# Patient Record
Sex: MALE | Race: BLACK OR AFRICAN AMERICAN | NOT HISPANIC OR LATINO | URBAN - METROPOLITAN AREA
[De-identification: names, ages, dates, MRNs, and addresses within clinical notes are randomized per-mention and may not be internally consistent; named-entity substitution may affect disease eponyms.]

---

## 2017-08-18 ENCOUNTER — APPOINTMENT (RX ONLY)
Dept: URBAN - METROPOLITAN AREA CLINIC 37 | Facility: CLINIC | Age: 62
Setting detail: DERMATOLOGY
End: 2017-08-18

## 2017-08-18 ENCOUNTER — APPOINTMENT (RX ONLY)
Dept: URBAN - METROPOLITAN AREA CLINIC 38 | Facility: CLINIC | Age: 62
Setting detail: DERMATOLOGY
End: 2017-08-18

## 2017-08-18 DIAGNOSIS — L27.1 LOCALIZED SKIN ERUPTION DUE TO DRUGS AND MEDICAMENTS TAKEN INTERNALLY: ICD-10-CM

## 2017-08-18 DIAGNOSIS — L85.3 XEROSIS CUTIS: ICD-10-CM

## 2017-08-18 PROBLEM — L70.0 ACNE VULGARIS: Status: ACTIVE | Noted: 2017-08-18

## 2017-08-18 PROBLEM — J30.1 ALLERGIC RHINITIS DUE TO POLLEN: Status: ACTIVE | Noted: 2017-08-18

## 2017-08-18 PROBLEM — L30.9 DERMATITIS, UNSPECIFIED: Status: ACTIVE | Noted: 2017-08-18

## 2017-08-18 PROBLEM — Z85.118 PERSONAL HISTORY OF OTHER MALIGNANT NEOPLASM OF BRONCHUS AND LUNG: Status: ACTIVE | Noted: 2017-08-18

## 2017-08-18 PROCEDURE — ? TREATMENT REGIMEN

## 2017-08-18 PROCEDURE — 99202 OFFICE O/P NEW SF 15 MIN: CPT

## 2017-08-18 PROCEDURE — ? ADDITIONAL NOTES

## 2017-08-18 PROCEDURE — ? COUNSELING

## 2017-08-18 PROCEDURE — ? PRESCRIPTION

## 2017-08-18 RX ORDER — UREA 40 G/100G
CREAM TOPICAL TWICE DAILY
Qty: 1 | Refills: 3 | Status: ERX | COMMUNITY
Start: 2017-08-18

## 2017-08-18 RX ORDER — CLOBETASOL PROPIONATE 0.05 G/ML
SPRAY TOPICAL TWICE DAILY
Qty: 1 | Refills: 3 | Status: ERX | COMMUNITY
Start: 2017-08-18

## 2017-08-18 RX ADMIN — UREA: 40 CREAM TOPICAL at 14:52

## 2017-08-18 RX ADMIN — CLOBETASOL PROPIONATE: 0.05 SPRAY TOPICAL at 14:52

## 2017-08-18 ASSESSMENT — LOCATION ZONE DERM
LOCATION ZONE: PENIS
LOCATION ZONE: HAND
LOCATION ZONE: PENIS
LOCATION ZONE: HAND

## 2017-08-18 ASSESSMENT — LOCATION SIMPLE DESCRIPTION DERM
LOCATION SIMPLE: RIGHT HAND
LOCATION SIMPLE: LEFT HAND
LOCATION SIMPLE: PENIS
LOCATION SIMPLE: RIGHT HAND
LOCATION SIMPLE: PENIS
LOCATION SIMPLE: LEFT HAND

## 2017-08-18 ASSESSMENT — LOCATION DETAILED DESCRIPTION DERM
LOCATION DETAILED: RIGHT RADIAL PALM
LOCATION DETAILED: LEFT DORSAL SHAFT OF PENIS
LOCATION DETAILED: LEFT DORSAL SHAFT OF PENIS
LOCATION DETAILED: RIGHT RADIAL PALM
LOCATION DETAILED: LEFT RADIAL PALM
LOCATION DETAILED: LEFT RADIAL PALM

## 2017-08-18 NOTE — PROCEDURE: TREATMENT REGIMEN
Discontinue Regimen: Eucerin
Detail Level: Zone
Plan: Instructed patient to sign medical release form so our office can get his records from [facility] regarding which Chemotherapeutic agent he is currently on that caused the rash. Advised patient to use the Urea Cream on the affected area along with Clobex a spray avoiding the genitals. Also counseled patient on applying Vaseline to hands at night and sleep with gloves on. Patient verbalized understating of treatment plan and agreed. Patient will follow up in 2 weeks

## 2017-08-18 NOTE — HPI: RASH
How Severe Is Your Rash?: mild
Is This A New Presentation, Or A Follow-Up?: Rash
Additional History: Patient was diagnosed Liver and Lung Cancer a year ago. Patient moved from here recently from [place] and started seeing Dr. Mckee for his cancer. Since moving here patient started a new Chemotherapeutic drug. Patient stated one of the side effects of the drug is a rash. Patient states he is having extremely dry cracked skin on the hands and the genitals. The areas are also hyperpigmented. Patient has been using Eucerin on the rash with no signs of improvement.

## 2017-08-18 NOTE — PROCEDURE: MIPS QUALITY
Quality 431: Preventive Care And Screening: Unhealthy Alcohol Use - Screening: Patient screened for unhealthy alcohol use using a single question and scores less than 2 times per year
Detail Level: Detailed
Quality 226: Preventive Care And Screening: Tobacco Use: Screening And Cessation Intervention: Patient screened for tobacco and is a smoker AND received Cessation Counseling
Quality 130: Documentation Of Current Medications In The Medical Record: Current Medications Documented

## 2017-08-18 NOTE — PROCEDURE: TREATMENT REGIMEN
Plan: Instructed patient to sign medical release form so our office can get his records from [facility] regarding which Chemotherapeutic agent he is currently on that caused the rash. Advised patient to use the Urea Cream on the affected area along with Clobex a spray avoiding the genitals. Also counseled patient on applying Vaseline to hands at night and sleep with gloves on. Patient verbalized understating of treatment plan and agreed. Patient will follow up in 2 weeks
Detail Level: Zone
Discontinue Regimen: Eucerin

## 2017-08-18 NOTE — PROCEDURE: ADDITIONAL NOTES
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  He does not know which chemotherapy he is using.  Pt reports that his hands are itchy and dry.  They become painful when they crack.  Pt reports the rash happened after 2 weeks.\\n\\nDDx of psoriasiform dermatiits includes drug-induced psoriasis, hand/foot syndrome (from chemotherapy), versus eczematous dermatitis.\\n\\nWill treat hands with clobex spray BID to affected area (avoid on face/genitals/body folds).  SE fully discussed.  Will also treat with urea cream BID to affected area. Dry skin care discussed. Pt should apply vaseline BID to affected area.\\n\\nPt has some xerotic scale of the penile shaft.  Will apply vaseline to that area.\\n\\nPt agreed with treatment plan and will f/u in 2 weeks to evaluate response to treatment.

## 2017-08-18 NOTE — HPI: RASH
How Severe Is Your Rash?: mild
Is This A New Presentation, Or A Follow-Up?: Rash
Additional History: Patient was diagnosed Liver and Lung Cancer a year ago. Patient moved from here recently from [place] and started seeing Dr. Laurent for his cancer. Since moving here patient started a new Chemotherapeutic drug. Patient stated one of the side effects of the drug is a rash. Patient states he is having extremely dry cracked skin on the hands and the genitals. The areas are also hyperpigmented. Patient has been using Eucerin on the rash with no signs of improvement.